# Patient Record
Sex: FEMALE | Race: OTHER | HISPANIC OR LATINO | URBAN - METROPOLITAN AREA
[De-identification: names, ages, dates, MRNs, and addresses within clinical notes are randomized per-mention and may not be internally consistent; named-entity substitution may affect disease eponyms.]

---

## 2019-08-18 ENCOUNTER — APPOINTMENT (INPATIENT)
Dept: RADIOLOGY | Facility: HOSPITAL | Age: 49
DRG: 141 | End: 2019-08-18
Payer: COMMERCIAL

## 2019-08-18 ENCOUNTER — HOSPITAL ENCOUNTER (INPATIENT)
Facility: HOSPITAL | Age: 49
LOS: 1 days | Discharge: HOME | DRG: 141 | End: 2019-08-19
Attending: EMERGENCY MEDICINE | Admitting: INTERNAL MEDICINE
Payer: COMMERCIAL

## 2019-08-18 DIAGNOSIS — J45.41 MODERATE PERSISTENT ASTHMA WITH ACUTE EXACERBATION: ICD-10-CM

## 2019-08-18 DIAGNOSIS — J45.902 STATUS ASTHMATICUS: Primary | ICD-10-CM

## 2019-08-18 PROBLEM — R76.12 (QFT) QUANTIFERON-TB TEST REACTION WITHOUT ACTIVE TUBERCULOSIS: Status: ACTIVE | Noted: 2019-08-18

## 2019-08-18 PROBLEM — J45.901 ACUTE ASTHMA EXACERBATION: Status: ACTIVE | Noted: 2019-08-18

## 2019-08-18 LAB
ALBUMIN SERPL BCP-MCNC: 3.7 G/DL (ref 3.5–5)
ALP SERPL-CCNC: 67 U/L (ref 46–116)
ALT SERPL W P-5'-P-CCNC: 16 U/L (ref 12–78)
ANION GAP SERPL CALCULATED.3IONS-SCNC: 7 MMOL/L (ref 4–13)
AST SERPL W P-5'-P-CCNC: 15 U/L (ref 5–45)
BASOPHILS # BLD AUTO: 0.02 THOUSANDS/ΜL (ref 0–0.1)
BASOPHILS NFR BLD AUTO: 0 % (ref 0–1)
BILIRUB SERPL-MCNC: 0.11 MG/DL (ref 0.2–1)
BUN SERPL-MCNC: 13 MG/DL (ref 5–25)
CALCIUM SERPL-MCNC: 8.4 MG/DL (ref 8.3–10.1)
CHLORIDE SERPL-SCNC: 107 MMOL/L (ref 100–108)
CO2 SERPL-SCNC: 27 MMOL/L (ref 21–32)
CREAT SERPL-MCNC: 0.89 MG/DL (ref 0.6–1.3)
EOSINOPHIL # BLD AUTO: 0.04 THOUSAND/ΜL (ref 0–0.61)
EOSINOPHIL NFR BLD AUTO: 1 % (ref 0–6)
ERYTHROCYTE [DISTWIDTH] IN BLOOD BY AUTOMATED COUNT: 11.9 % (ref 11.6–15.1)
GFR SERPL CREATININE-BSD FRML MDRD: 76 ML/MIN/1.73SQ M
GLUCOSE SERPL-MCNC: 143 MG/DL (ref 65–140)
HCT VFR BLD AUTO: 39.9 % (ref 34.8–46.1)
HGB BLD-MCNC: 13.5 G/DL (ref 11.5–15.4)
IMM GRANULOCYTES # BLD AUTO: 0.03 THOUSAND/UL (ref 0–0.2)
IMM GRANULOCYTES NFR BLD AUTO: 0 % (ref 0–2)
LYMPHOCYTES # BLD AUTO: 0.47 THOUSANDS/ΜL (ref 0.6–4.47)
LYMPHOCYTES NFR BLD AUTO: 7 % (ref 14–44)
MAGNESIUM SERPL-MCNC: 2.2 MG/DL (ref 1.6–2.6)
MCH RBC QN AUTO: 32.1 PG (ref 26.8–34.3)
MCHC RBC AUTO-ENTMCNC: 33.8 G/DL (ref 31.4–37.4)
MCV RBC AUTO: 95 FL (ref 82–98)
MONOCYTES # BLD AUTO: 0.13 THOUSAND/ΜL (ref 0.17–1.22)
MONOCYTES NFR BLD AUTO: 2 % (ref 4–12)
NEUTROPHILS # BLD AUTO: 6.35 THOUSANDS/ΜL (ref 1.85–7.62)
NEUTS SEG NFR BLD AUTO: 90 % (ref 43–75)
NRBC BLD AUTO-RTO: 0 /100 WBCS
PLATELET # BLD AUTO: 184 THOUSANDS/UL (ref 149–390)
PMV BLD AUTO: 10.5 FL (ref 8.9–12.7)
POTASSIUM SERPL-SCNC: 3.4 MMOL/L (ref 3.5–5.3)
PROT SERPL-MCNC: 7.1 G/DL (ref 6.4–8.2)
RBC # BLD AUTO: 4.21 MILLION/UL (ref 3.81–5.12)
SODIUM SERPL-SCNC: 141 MMOL/L (ref 136–145)
WBC # BLD AUTO: 7.04 THOUSAND/UL (ref 4.31–10.16)

## 2019-08-18 PROCEDURE — 99285 EMERGENCY DEPT VISIT HI MDM: CPT

## 2019-08-18 PROCEDURE — 99291 CRITICAL CARE FIRST HOUR: CPT | Performed by: EMERGENCY MEDICINE

## 2019-08-18 PROCEDURE — 71045 X-RAY EXAM CHEST 1 VIEW: CPT

## 2019-08-18 PROCEDURE — 85025 COMPLETE CBC W/AUTO DIFF WBC: CPT | Performed by: PHYSICIAN ASSISTANT

## 2019-08-18 PROCEDURE — 83735 ASSAY OF MAGNESIUM: CPT | Performed by: PHYSICIAN ASSISTANT

## 2019-08-18 PROCEDURE — 94640 AIRWAY INHALATION TREATMENT: CPT

## 2019-08-18 PROCEDURE — 80053 COMPREHEN METABOLIC PANEL: CPT | Performed by: PHYSICIAN ASSISTANT

## 2019-08-18 PROCEDURE — 99222 1ST HOSP IP/OBS MODERATE 55: CPT | Performed by: PHYSICIAN ASSISTANT

## 2019-08-18 RX ORDER — LEVALBUTEROL 1.25 MG/.5ML
1.25 SOLUTION, CONCENTRATE RESPIRATORY (INHALATION)
Status: DISCONTINUED | OUTPATIENT
Start: 2019-08-19 | End: 2019-08-19

## 2019-08-18 RX ORDER — METHYLPREDNISOLONE SODIUM SUCCINATE 40 MG/ML
40 INJECTION, POWDER, LYOPHILIZED, FOR SOLUTION INTRAMUSCULAR; INTRAVENOUS EVERY 8 HOURS SCHEDULED
Status: DISCONTINUED | OUTPATIENT
Start: 2019-08-19 | End: 2019-08-19

## 2019-08-18 RX ORDER — ALBUTEROL SULFATE 90 UG/1
2 AEROSOL, METERED RESPIRATORY (INHALATION) EVERY 4 HOURS PRN
COMMUNITY
Start: 2019-05-02 | End: 2020-05-01

## 2019-08-18 RX ORDER — LORATADINE 10 MG/1
10 TABLET ORAL DAILY
Status: DISCONTINUED | OUTPATIENT
Start: 2019-08-19 | End: 2019-08-19 | Stop reason: HOSPADM

## 2019-08-18 RX ORDER — ALBUTEROL SULFATE 90 UG/1
2 AEROSOL, METERED RESPIRATORY (INHALATION) EVERY 4 HOURS PRN
Status: DISCONTINUED | OUTPATIENT
Start: 2019-08-18 | End: 2019-08-19 | Stop reason: HOSPADM

## 2019-08-18 RX ORDER — SODIUM CHLORIDE FOR INHALATION 0.9 %
3 VIAL, NEBULIZER (ML) INHALATION
Status: DISCONTINUED | OUTPATIENT
Start: 2019-08-19 | End: 2019-08-19

## 2019-08-18 RX ORDER — SODIUM CHLORIDE FOR INHALATION 0.9 %
12 VIAL, NEBULIZER (ML) INHALATION ONCE
Status: COMPLETED | OUTPATIENT
Start: 2019-08-18 | End: 2019-08-18

## 2019-08-18 RX ORDER — BENZONATATE 100 MG/1
100 CAPSULE ORAL 3 TIMES DAILY PRN
Status: DISCONTINUED | OUTPATIENT
Start: 2019-08-18 | End: 2019-08-19

## 2019-08-18 RX ORDER — GUAIFENESIN 600 MG
600 TABLET, EXTENDED RELEASE 12 HR ORAL 2 TIMES DAILY
Status: DISCONTINUED | OUTPATIENT
Start: 2019-08-18 | End: 2019-08-19

## 2019-08-18 RX ORDER — IPRATROPIUM BROMIDE AND ALBUTEROL SULFATE .5; 3 MG/3ML; MG/3ML
1 SOLUTION RESPIRATORY (INHALATION) ONCE
Status: COMPLETED | OUTPATIENT
Start: 2019-08-18 | End: 2019-08-18

## 2019-08-18 RX ORDER — FLUTICASONE PROPIONATE 50 MCG
2 SPRAY, SUSPENSION (ML) NASAL DAILY
Status: DISCONTINUED | OUTPATIENT
Start: 2019-08-19 | End: 2019-08-19 | Stop reason: HOSPADM

## 2019-08-18 RX ORDER — FEXOFENADINE HCL 180 MG/1
180 TABLET ORAL DAILY
COMMUNITY
Start: 2019-06-04 | End: 2020-06-03

## 2019-08-18 RX ORDER — METHYLPREDNISOLONE SOD SUCC 125 MG
1 VIAL (EA) INJECTION ONCE
Status: COMPLETED | OUTPATIENT
Start: 2019-08-18 | End: 2019-08-18

## 2019-08-18 RX ORDER — ACETAMINOPHEN 325 MG/1
650 TABLET ORAL EVERY 6 HOURS PRN
Status: DISCONTINUED | OUTPATIENT
Start: 2019-08-18 | End: 2019-08-19

## 2019-08-18 RX ORDER — ONDANSETRON 2 MG/ML
4 INJECTION INTRAMUSCULAR; INTRAVENOUS EVERY 6 HOURS PRN
Status: DISCONTINUED | OUTPATIENT
Start: 2019-08-18 | End: 2019-08-19

## 2019-08-18 RX ADMIN — ISODIUM CHLORIDE 12 ML: 0.03 SOLUTION RESPIRATORY (INHALATION) at 19:19

## 2019-08-18 RX ADMIN — GUAIFENESIN 600 MG: 600 TABLET, EXTENDED RELEASE ORAL at 23:10

## 2019-08-18 RX ADMIN — ALBUTEROL SULFATE 10 MG: 2.5 SOLUTION RESPIRATORY (INHALATION) at 19:18

## 2019-08-18 RX ADMIN — IPRATROPIUM BROMIDE 1 MG: 0.5 SOLUTION RESPIRATORY (INHALATION) at 19:18

## 2019-08-18 RX ADMIN — RIFAMPIN 300 MG: 300 CAPSULE ORAL at 23:11

## 2019-08-18 NOTE — ED PROVIDER NOTES
History  Chief Complaint   Patient presents with    Asthma     arrives via EMS from home  hx of asthma  medications changed a few months ago and asthma getting worse  reports moving into new home with no ac and lots of dust  became short of breath  no relief with albuterol inhaler  recieved duoneb and solumedrol in route  46 yo female with longstanding h/o asthma, has never been admitted, but has been followed for years by pulmonologist, c/o sudden onset severe chest tightness and wheezing, to the point that she was almost unable to breath at all, had to be helped by a friend to puff on her inhaler while she laid on the floor, seems to have been triggered by dust while cleaning out a new house  They called an ambulance and she was given breathing treatment and steroids, and is now still wheezing, using accessory muscles but able to speak to registration when I came in  History provided by:  Patient  Shortness of Breath   Severity:  Severe  Onset quality:  Sudden  Duration:  1 hour  Timing:  Constant  Progression:  Improving  Chronicity:  New  Relieved by:  Nothing  Ineffective treatments:  Inhaler  Associated symptoms: cough    Associated symptoms: no abdominal pain, no chest pain, no fever, no headaches, no neck pain, no rash, no sore throat, no vomiting and no wheezing        Prior to Admission Medications   Prescriptions Last Dose Informant Patient Reported? Taking?    albuterol (PROVENTIL HFA,VENTOLIN HFA) 90 mcg/act inhaler   Yes Yes   Sig: Inhale 2 puffs every 4 (four) hours as needed   fexofenadine (ALLEGRA) 180 MG tablet   Yes Yes   Sig: Take 180 mg by mouth daily   fluticasone (VERAMYST) 27 5 MCG/SPRAY nasal spray   Yes Yes   Sig: spray 1 spray (27 5MCG)  by intranasal route  every day in each nostril   rifampin (RIFADIN) 300 mg capsule   Yes No   Sig: Take 300 mg by mouth daily      Facility-Administered Medications: None       Past Medical History:   Diagnosis Date    Asthma        Past Surgical History:   Procedure Laterality Date    HYSTERECTOMY         History reviewed  No pertinent family history  I have reviewed and agree with the history as documented  Social History     Tobacco Use    Smoking status: Never Smoker    Smokeless tobacco: Never Used   Substance Use Topics    Alcohol use: Not Currently     Frequency: Never     Binge frequency: Never    Drug use: Not Currently        Review of Systems   Constitutional: Negative for appetite change, chills and fever  HENT: Negative for sore throat  Respiratory: Positive for cough and shortness of breath  Negative for wheezing  Cardiovascular: Negative for chest pain and palpitations  Gastrointestinal: Negative for abdominal pain, diarrhea, nausea and vomiting  Genitourinary: Negative for dysuria and hematuria  Musculoskeletal: Negative for neck pain  Skin: Negative for rash  Neurological: Negative for dizziness, weakness and headaches  Psychiatric/Behavioral: Negative for suicidal ideas  All other systems reviewed and are negative  Physical Exam  Physical Exam   Constitutional: She is oriented to person, place, and time  She appears well-developed and well-nourished  She appears distressed  HENT:   Head: Normocephalic and atraumatic  Right Ear: External ear normal    Left Ear: External ear normal    Nose: Nose normal    Eyes: Pupils are equal, round, and reactive to light  Conjunctivae and EOM are normal    Neck: Normal range of motion  Neck supple  Cardiovascular: Normal rate and regular rhythm  Pulmonary/Chest: Accessory muscle usage present  Tachypnea noted  She is in respiratory distress  She has decreased breath sounds  She has wheezes  Abdominal: Soft  Musculoskeletal: Normal range of motion  Neurological: She is alert and oriented to person, place, and time  She has normal strength  Gait normal    Skin: Skin is warm and dry  Capillary refill takes less than 2 seconds  No rash noted   She is not diaphoretic  Psychiatric: She has a normal mood and affect  Her behavior is normal  Judgment and thought content normal    Nursing note and vitals reviewed        Vital Signs  ED Triage Vitals [08/18/19 1849]   Temperature Pulse Respirations Blood Pressure SpO2   98 2 °F (36 8 °C) 96 20 144/84 98 %      Temp Source Heart Rate Source Patient Position - Orthostatic VS BP Location FiO2 (%)   Oral Monitor Lying Right arm --      Pain Score       No Pain           Vitals:    08/18/19 1849 08/18/19 2033 08/18/19 2318   BP: 144/84 128/86 130/90   Pulse: 96 102 91   Patient Position - Orthostatic VS: Lying Lying Lying         Visual Acuity      ED Medications  Medications   loratadine (CLARITIN) tablet 10 mg (has no administration in time range)   fluticasone (FLONASE) 50 mcg/act nasal spray 2 spray (has no administration in time range)   rifampin (RIFADIN) capsule 300 mg (300 mg Oral Given 8/18/19 2311)   ondansetron (ZOFRAN) injection 4 mg (has no administration in time range)   guaiFENesin (MUCINEX) 12 hr tablet 600 mg (600 mg Oral Given 8/18/19 2310)   benzonatate (TESSALON PERLES) capsule 100 mg (has no administration in time range)   levalbuterol (XOPENEX) inhalation solution 1 25 mg (has no administration in time range)     And   sodium chloride 0 9 % inhalation solution 3 mL (has no administration in time range)   ipratropium (ATROVENT) 0 02 % inhalation solution 0 5 mg (has no administration in time range)   methylPREDNISolone sodium succinate (Solu-MEDROL) injection 40 mg (has no administration in time range)   acetaminophen (TYLENOL) tablet 650 mg (has no administration in time range)   albuterol (PROVENTIL HFA,VENTOLIN HFA) inhaler 2 puff (has no administration in time range)   ipratropium-albuterol (FOR EMS ONLY) (DUO-NEB) 0 5-2 5 mg/3 mL inhalation solution 3 mL (0 mL Does not apply Given to EMS 8/18/19 1852)   methylPREDNISolone sodium succinate (FOR EMS ONLY) (Solu-MEDROL) 125 MG injection 125 mg (0 mg Does not apply Given to EMS 8/18/19 1852)   albuterol inhalation solution 10 mg (10 mg Nebulization Given 8/18/19 1918)   ipratropium (ATROVENT) 0 02 % inhalation solution 1 mg (1 mg Nebulization Given 8/18/19 1918)   sodium chloride 0 9 % inhalation solution 12 mL (12 mL Nebulization Given 8/18/19 1919)       Diagnostic Studies  Results Reviewed     None                 XR chest portable    (Results Pending)              Procedures  CriticalCare Time  Performed by: David Wilcox MD  Authorized by: David Wilcox MD     Critical care provider statement:     Critical care time (minutes):  30    Critical care time was exclusive of:  Separately billable procedures and treating other patients and teaching time    Critical care was necessary to treat or prevent imminent or life-threatening deterioration of the following conditions:  Respiratory failure    Critical care was time spent personally by me on the following activities:  Blood draw for specimens, obtaining history from patient or surrogate, development of treatment plan with patient or surrogate, discussions with consultants, evaluation of patient's response to treatment, examination of patient, interpretation of cardiac output measurements, ordering and performing treatments and interventions, re-evaluation of patient's condition and review of old charts    I assumed direction of critical care for this patient from another provider in my specialty: no             ED Course  ED Course as of Aug 18 2347   Sun Aug 18, 2019   2051 After NASH, still has significant bilateral wheezing, dyspnea, albeit improving, I am recommending admission for serial nebs, steroids                                      MDM    Disposition  Final diagnoses:   Status asthmaticus     Time reflects when diagnosis was documented in both MDM as applicable and the Disposition within this note     Time User Action Codes Description Comment    8/18/2019  8:56 PM Kiarra Wallace Add [J45 902] Status asthmaticus       ED Disposition     ED Disposition Condition Date/Time Comment    Admit Stable Sun Aug 18, 2019  8:56 PM Case was discussed with Delmy Carbajal and the patient's admission status was agreed to be Admission Status: inpatient status to the service of Dr Jimenez Bull   Follow-up Information    None         Current Discharge Medication List      CONTINUE these medications which have NOT CHANGED    Details   albuterol (PROVENTIL HFA,VENTOLIN HFA) 90 mcg/act inhaler Inhale 2 puffs every 4 (four) hours as needed    Comments: Substitution to a formulary equivalent within the same pharmaceutical class is authorized  fexofenadine (ALLEGRA) 180 MG tablet Take 180 mg by mouth daily      fluticasone (VERAMYST) 27 5 MCG/SPRAY nasal spray spray 1 spray (27 5MCG)  by intranasal route  every day in each nostril      rifampin (RIFADIN) 300 mg capsule Take 300 mg by mouth daily           No discharge procedures on file      ED Provider  Electronically Signed by           Preet Edwards MD  08/18/19 3574

## 2019-08-19 VITALS
TEMPERATURE: 98.9 F | WEIGHT: 150.35 LBS | HEART RATE: 76 BPM | SYSTOLIC BLOOD PRESSURE: 118 MMHG | DIASTOLIC BLOOD PRESSURE: 81 MMHG | OXYGEN SATURATION: 95 % | RESPIRATION RATE: 18 BRPM

## 2019-08-19 LAB
ANION GAP SERPL CALCULATED.3IONS-SCNC: 9 MMOL/L (ref 4–13)
BASOPHILS # BLD AUTO: 0.02 THOUSANDS/ΜL (ref 0–0.1)
BASOPHILS NFR BLD AUTO: 0 % (ref 0–1)
BUN SERPL-MCNC: 12 MG/DL (ref 5–25)
CALCIUM SERPL-MCNC: 8.2 MG/DL (ref 8.3–10.1)
CHLORIDE SERPL-SCNC: 108 MMOL/L (ref 100–108)
CO2 SERPL-SCNC: 24 MMOL/L (ref 21–32)
CREAT SERPL-MCNC: 0.64 MG/DL (ref 0.6–1.3)
EOSINOPHIL # BLD AUTO: 0.06 THOUSAND/ΜL (ref 0–0.61)
EOSINOPHIL NFR BLD AUTO: 1 % (ref 0–6)
ERYTHROCYTE [DISTWIDTH] IN BLOOD BY AUTOMATED COUNT: 11.9 % (ref 11.6–15.1)
GFR SERPL CREATININE-BSD FRML MDRD: 104 ML/MIN/1.73SQ M
GLUCOSE SERPL-MCNC: 91 MG/DL (ref 65–140)
HCT VFR BLD AUTO: 38.5 % (ref 34.8–46.1)
HGB BLD-MCNC: 12.7 G/DL (ref 11.5–15.4)
IMM GRANULOCYTES # BLD AUTO: 0.02 THOUSAND/UL (ref 0–0.2)
IMM GRANULOCYTES NFR BLD AUTO: 0 % (ref 0–2)
LYMPHOCYTES # BLD AUTO: 1.6 THOUSANDS/ΜL (ref 0.6–4.47)
LYMPHOCYTES NFR BLD AUTO: 23 % (ref 14–44)
MCH RBC QN AUTO: 31.1 PG (ref 26.8–34.3)
MCHC RBC AUTO-ENTMCNC: 33 G/DL (ref 31.4–37.4)
MCV RBC AUTO: 94 FL (ref 82–98)
MONOCYTES # BLD AUTO: 0.39 THOUSAND/ΜL (ref 0.17–1.22)
MONOCYTES NFR BLD AUTO: 6 % (ref 4–12)
NEUTROPHILS # BLD AUTO: 4.78 THOUSANDS/ΜL (ref 1.85–7.62)
NEUTS SEG NFR BLD AUTO: 70 % (ref 43–75)
NRBC BLD AUTO-RTO: 0 /100 WBCS
PLATELET # BLD AUTO: 179 THOUSANDS/UL (ref 149–390)
PMV BLD AUTO: 10.2 FL (ref 8.9–12.7)
POTASSIUM SERPL-SCNC: 3.7 MMOL/L (ref 3.5–5.3)
RBC # BLD AUTO: 4.08 MILLION/UL (ref 3.81–5.12)
SODIUM SERPL-SCNC: 141 MMOL/L (ref 136–145)
WBC # BLD AUTO: 6.87 THOUSAND/UL (ref 4.31–10.16)

## 2019-08-19 PROCEDURE — 85025 COMPLETE CBC W/AUTO DIFF WBC: CPT | Performed by: PHYSICIAN ASSISTANT

## 2019-08-19 PROCEDURE — 94640 AIRWAY INHALATION TREATMENT: CPT

## 2019-08-19 PROCEDURE — 94760 N-INVAS EAR/PLS OXIMETRY 1: CPT

## 2019-08-19 PROCEDURE — 99239 HOSP IP/OBS DSCHRG MGMT >30: CPT | Performed by: INTERNAL MEDICINE

## 2019-08-19 PROCEDURE — 80048 BASIC METABOLIC PNL TOTAL CA: CPT | Performed by: PHYSICIAN ASSISTANT

## 2019-08-19 RX ORDER — METHYLPREDNISOLONE SODIUM SUCCINATE 40 MG/ML
20 INJECTION, POWDER, LYOPHILIZED, FOR SOLUTION INTRAMUSCULAR; INTRAVENOUS EVERY 8 HOURS SCHEDULED
Status: DISCONTINUED | OUTPATIENT
Start: 2019-08-19 | End: 2019-08-19

## 2019-08-19 RX ORDER — PREDNISONE 20 MG/1
20 TABLET ORAL DAILY
Status: DISCONTINUED | OUTPATIENT
Start: 2019-08-20 | End: 2019-08-19 | Stop reason: HOSPADM

## 2019-08-19 RX ORDER — PREDNISONE 20 MG/1
20 TABLET ORAL DAILY
Qty: 5 TABLET | Refills: 0 | Status: SHIPPED | OUTPATIENT
Start: 2019-08-20

## 2019-08-19 RX ADMIN — FLUTICASONE PROPIONATE 2 SPRAY: 50 SPRAY, METERED NASAL at 09:36

## 2019-08-19 RX ADMIN — IPRATROPIUM BROMIDE 0.5 MG: 0.5 SOLUTION RESPIRATORY (INHALATION) at 08:02

## 2019-08-19 RX ADMIN — LORATADINE 10 MG: 10 TABLET ORAL at 09:36

## 2019-08-19 RX ADMIN — LEVALBUTEROL 1.25 MG: 1.25 SOLUTION, CONCENTRATE RESPIRATORY (INHALATION) at 08:02

## 2019-08-19 RX ADMIN — METHYLPREDNISOLONE SODIUM SUCCINATE 40 MG: 40 INJECTION, POWDER, FOR SOLUTION INTRAMUSCULAR; INTRAVENOUS at 05:04

## 2019-08-19 RX ADMIN — RIFAMPIN 300 MG: 300 CAPSULE ORAL at 09:36

## 2019-08-19 NOTE — NURSING NOTE
Pt given discharge instructions in company of SO  Peripheral IVs removed  Script given to patient  Pt able to ambulate self out of hospital  Home medications obtained from pharmacy and sent home with pt  Work excuse note obtained from Dr Rufina Foley and given to pt  No questions per pt and SO at this time

## 2019-08-19 NOTE — PROGRESS NOTES
Progress Note - Albino Chang 47 y o  female MRN: [id number]    Unit/Bed#: [place] [address] -01 Encounter: [id number]    Assessment/Plan:    Acute asthma exacerbation  continue IV steroids will taper to 20 milligrams q 8 hours, p r n  Albuterol, closely monitor pulse ox, await chest x-ray results    Latent TB    continue rifampin    Seasonal allergies   continue Claritin    Subjective:   Feels better, still cough yellow sputum no chest pain no nausea vomiting diarrhea no fevers chills appetite is okay    Objective:     Vitals: Blood pressure 118/81, pulse 76, temperature 98 9 °F (37 2 °C), temperature source Temporal, resp  rate 18, weight 68 2 kg (150 lb 5 7 oz), SpO2 95 %  ,There is no height or weight on file to calculate BMI        Results from last 7 days   Lab Units 08/19/19  0458   WBC Thousand/uL 6 87   HEMOGLOBIN g/dL 12 7   HEMATOCRIT % 38 5   PLATELETS Thousands/uL 179     Results from last 7 days   Lab Units 08/19/19  0458 08/18/19  2240   POTASSIUM mmol/L 3 7 3 4*   CHLORIDE mmol/L 108 107   CO2 mmol/L 24 27   BUN mg/dL 12 13   CREATININE mg/dL 0 64 0 89   CALCIUM mg/dL 8 2* 8 4   ALK PHOS U/L  --  67   ALT U/L  --  16   AST U/L  --  15       Scheduled Meds:    Current Facility-Administered Medications:  acetaminophen 650 mg Oral Q6H PRN Nimo Kiara, PA-C   albuterol 2 puff Inhalation Q4H PRN Nimo Kiara, PA-C   benzonatate 100 mg Oral TID PRN Nimo Craig, PA-C   fluticasone 2 spray Each Nare Daily Nimo Craig, PA-C   loratadine 10 mg Oral Daily MAE Aguirre-MARYSE   methylPREDNISolone sodium succinate 20 mg Intravenous [facility], DO   ondansetron 4 mg Intravenous Q6H PRN Ayesha Ojeda PA-C   rifampin 300 mg Oral Q12H [address] Nimotoy Craig, PA-C       Continuous Infusions:     Physical exam:  General appearance:  Alert no distress interaction appropriate   Head/Eyes:  Nonicteric PERRL EOMI  Neck:  Supple  Lungs:  Decreased BS bilateral occasional inspiratory squeak/wheeze  Heart: normal S1 S2 regular  Abdomen: Soft nontender with bowel sounds  Extremities: no edema  Skin: no rash    Invasive Devices     Peripheral Intravenous Line            Peripheral IV 08/18/19 Left Hand less than 1 day    Peripheral IV 08/19/19 Left Antecubital less than 1 day                  Counseling / Coordination of Care  Total floor / unit time spent today 30  minutes  Greater than 50% of total time was spent with the patient and / or family counseling and / or coordination of care    A description of the counseling / coordination of care:

## 2019-08-19 NOTE — PLAN OF CARE
Problem: RESPIRATORY - ADULT  Goal: Achieves optimal ventilation and oxygenation  Description  INTERVENTIONS:  - Assess for changes in respiratory status  - Assess for changes in mentation and behavior  - Position to facilitate oxygenation and minimize respiratory effort  - Oxygen administered by appropriate delivery if ordered  - Initiate smoking cessation education as indicated  - Encourage broncho-pulmonary hygiene including cough, deep breathe, Incentive Spirometry  - Assess the need for suctioning and aspirate as needed  - Assess and instruct to report SOB or any respiratory difficulty  - Respiratory Therapy support as indicated  Outcome: Progressing     Problem: PAIN - ADULT  Goal: Verbalizes/displays adequate comfort level or baseline comfort level  Description  Interventions:  - Encourage patient to monitor pain and request assistance  - Assess pain using appropriate pain scale  - Administer analgesics based on type and severity of pain and evaluate response  - Implement non-pharmacological measures as appropriate and evaluate response  - Consider cultural and social influences on pain and pain management  - Notify physician/advanced practitioner if interventions unsuccessful or patient reports new pain  Outcome: Progressing     Problem: INFECTION - ADULT  Goal: Absence or prevention of progression during hospitalization  Description  INTERVENTIONS:  - Assess and monitor for signs and symptoms of infection  - Monitor lab/diagnostic results  - Monitor all insertion sites, i e  indwelling lines, tubes, and drains  - Monitor endotracheal if appropriate and nasal secretions for changes in amount and color  - [place] appropriate cooling/warming therapies per order  - Administer medications as ordered  - Instruct and encourage patient and family to use good hand hygiene technique  - Identify and instruct in appropriate isolation precautions for identified infection/condition  Outcome: Progressing  Goal: Absence of fever/infection during neutropenic period  Description  INTERVENTIONS:  - Monitor WBC    Outcome: Progressing     Problem: SAFETY ADULT  Goal: Patient will remain free of falls  Description  INTERVENTIONS:  - Assess patient frequently for physical needs  -  Identify cognitive and physical deficits and behaviors that affect risk of falls    -  [place] fall precautions as indicated by assessment   - Educate patient/family on patient safety including physical limitations  - Instruct patient to call for assistance with activity based on assessment  - Modify environment to reduce risk of injury  - Consider OT/PT consult to assist with strengthening/mobility  Outcome: Progressing  Goal: Maintain or return to baseline ADL function  Description  INTERVENTIONS:  -  Assess patient's ability to carry out ADLs; assess patient's baseline for ADL function and identify physical deficits which impact ability to perform ADLs (bathing, care of mouth/teeth, toileting, grooming, dressing, etc )  - Assess/evaluate cause of self-care deficits   - Assess range of motion  - Assess patient's mobility; develop plan if impaired  - Assess patient's need for assistive devices and provide as appropriate  - Encourage maximum independence but intervene and supervise when necessary  - Involve family in performance of ADLs  - Assess for home care needs following discharge   - Consider OT consult to assist with ADL evaluation and planning for discharge  - Provide patient education as appropriate  Outcome: Progressing  Goal: Maintain or return mobility status to optimal level  Description  INTERVENTIONS:  - Assess patient's baseline mobility status (ambulation, transfers, stairs, etc )    - Identify cognitive and physical deficits and behaviors that affect mobility  - Identify mobility aids required to assist with transfers and/or ambulation (gait belt, sit-to-stand, lift, walker, cane, etc )  - [place] fall precautions as indicated by assessment  - Record patient progress and toleration of activity level on Mobility SBAR; progress patient to next Phase/Stage  - Instruct patient to call for assistance with activity based on assessment  - Consider rehabilitation consult to assist with strengthening/weightbearing, etc   Outcome: Progressing     Problem: DISCHARGE PLANNING  Goal: Discharge to home or other facility with appropriate resources  Description  INTERVENTIONS:  - Identify barriers to discharge w/patient and caregiver  - Arrange for needed discharge resources and transportation as appropriate  - Identify discharge learning needs (meds, wound care, etc )  - Arrange for interpretive services to assist at discharge as needed  - Refer to Case Management Department for coordinating discharge planning if the patient needs post-hospital services based on physician/advanced practitioner order or complex needs related to functional status, cognitive ability, or social support system  Outcome: Progressing     Problem: Knowledge Deficit  Goal: Patient/family/caregiver demonstrates understanding of disease process, treatment plan, medications, and discharge instructions  Description  Complete learning assessment and assess knowledge base    Interventions:  - Provide teaching at level of understanding  - Provide teaching via preferred learning methods  Outcome: Progressing

## 2019-08-19 NOTE — ASSESSMENT & PLAN NOTE
· Follows with infectious disease as an outpatient  · + quantiFERON gold test however negative CXR - likely due to history of vaccination as a child  · Currently being treated for latent TB on rifampin 300 mg BID x 4 months - one month remaining  · Negative air flow room, standard precautions  · Obtain CXR  · Follow up with ID as an outpatient

## 2019-08-19 NOTE — PLAN OF CARE
Problem: RESPIRATORY - ADULT  Goal: Achieves optimal ventilation and oxygenation  Description  INTERVENTIONS:  - Assess for changes in respiratory status  - Assess for changes in mentation and behavior  - Position to facilitate oxygenation and minimize respiratory effort  - Oxygen administered by appropriate delivery if ordered  - Initiate smoking cessation education as indicated  - Encourage broncho-pulmonary hygiene including cough, deep breathe, Incentive Spirometry  - Assess the need for suctioning and aspirate as needed  - Assess and instruct to report SOB or any respiratory difficulty  - Respiratory Therapy support as indicated  Outcome: Progressing     Problem: PAIN - ADULT  Goal: Verbalizes/displays adequate comfort level or baseline comfort level  Description  Interventions:  - Encourage patient to monitor pain and request assistance  - Assess pain using appropriate pain scale  - Administer analgesics based on type and severity of pain and evaluate response  - Implement non-pharmacological measures as appropriate and evaluate response  - Consider cultural and social influences on pain and pain management  - Notify physician/advanced practitioner if interventions unsuccessful or patient reports new pain  Outcome: Progressing     Problem: INFECTION - ADULT  Goal: Absence or prevention of progression during hospitalization  Description  INTERVENTIONS:  - Assess and monitor for signs and symptoms of infection  - Monitor lab/diagnostic results  - Monitor all insertion sites, i e  indwelling lines, tubes, and drains  - Monitor endotracheal if appropriate and nasal secretions for changes in amount and color  - [place] appropriate cooling/warming therapies per order  - Administer medications as ordered  - Instruct and encourage patient and family to use good hand hygiene technique  - Identify and instruct in appropriate isolation precautions for identified infection/condition  Outcome: Progressing  Goal: Absence of fever/infection during neutropenic period  Description  INTERVENTIONS:  - Monitor WBC    Outcome: Progressing     Problem: SAFETY ADULT  Goal: Patient will remain free of falls  Description  INTERVENTIONS:  - Assess patient frequently for physical needs  -  Identify cognitive and physical deficits and behaviors that affect risk of falls    -  [place] fall precautions as indicated by assessment   - Educate patient/family on patient safety including physical limitations  - Instruct patient to call for assistance with activity based on assessment  - Modify environment to reduce risk of injury  - Consider OT/PT consult to assist with strengthening/mobility  Outcome: Progressing  Goal: Maintain or return to baseline ADL function  Description  INTERVENTIONS:  -  Assess patient's ability to carry out ADLs; assess patient's baseline for ADL function and identify physical deficits which impact ability to perform ADLs (bathing, care of mouth/teeth, toileting, grooming, dressing, etc )  - Assess/evaluate cause of self-care deficits   - Assess range of motion  - Assess patient's mobility; develop plan if impaired  - Assess patient's need for assistive devices and provide as appropriate  - Encourage maximum independence but intervene and supervise when necessary  - Involve family in performance of ADLs  - Assess for home care needs following discharge   - Consider OT consult to assist with ADL evaluation and planning for discharge  - Provide patient education as appropriate  Outcome: Progressing  Goal: Maintain or return mobility status to optimal level  Description  INTERVENTIONS:  - Assess patient's baseline mobility status (ambulation, transfers, stairs, etc )    - Identify cognitive and physical deficits and behaviors that affect mobility  - Identify mobility aids required to assist with transfers and/or ambulation (gait belt, sit-to-stand, lift, walker, cane, etc )  - [place] fall precautions as indicated by assessment  - Record patient progress and toleration of activity level on Mobility SBAR; progress patient to next Phase/Stage  - Instruct patient to call for assistance with activity based on assessment  - Consider rehabilitation consult to assist with strengthening/weightbearing, etc   Outcome: Progressing     Problem: DISCHARGE PLANNING  Goal: Discharge to home or other facility with appropriate resources  Description  INTERVENTIONS:  - Identify barriers to discharge w/patient and caregiver  - Arrange for needed discharge resources and transportation as appropriate  - Identify discharge learning needs (meds, wound care, etc )  - Arrange for interpretive services to assist at discharge as needed  - Refer to Case Management Department for coordinating discharge planning if the patient needs post-hospital services based on physician/advanced practitioner order or complex needs related to functional status, cognitive ability, or social support system  Outcome: Progressing     Problem: Knowledge Deficit  Goal: Patient/family/caregiver demonstrates understanding of disease process, treatment plan, medications, and discharge instructions  Description  Complete learning assessment and assess knowledge base    Interventions:  - Provide teaching at level of understanding  - Provide teaching via preferred learning methods  Outcome: Progressing

## 2019-08-19 NOTE — UTILIZATION REVIEW
Initial Clinical Review    Admission: Date/Time/Statement: 8/18/19 @ 2056     Orders Placed This Encounter   Procedures    Inpatient Admission     Standing Status:   Standing     Number of Occurrences:   1     Order Specific Question:   Admitting Physician     Answer:   Molly Junior [[id number]]     Order Specific Question:   Level of Care     Answer:   Med Surg [[id number]]     Order Specific Question:   Estimated length of stay     Answer:   More than 2 Midnights     Order Specific Question:   Certification     Answer:   I certify that inpatient services are medically necessary for this patient for a duration of greater than two midnights  See H&P and MD Progress Notes for additional information about the patient's course of treatment  ED Arrival Information     Expected Arrival Acuity Means of Arrival Escorted By Service Admission Type    - 8/18/2019 18:45 Urgent [facility] ([address]) General Medicine Urgent    Arrival Complaint    asthma        Chief Complaint   Patient presents with    Asthma     arrives via EMS from home  hx of asthma  medications changed a few months ago and asthma getting worse  reports moving into new home with no ac and lots of dust  became short of breath  no relief with albuterol inhaler  recieved duoneb and solumedrol in route  Assessment/Plan: 46 yo female w/ hx of asthma  presents to ED from home via EMS with  sudden onset severe chest tightness and wheezing, to the point that she was almost unable to breath at all  Had to be helped by a friend to puff on her inhaler while she laid on the floor, seems to have been triggered by dust while cleaning out a new house  EMS was called - given Nebs TX  And steroids  Still wheezing on arrival to ED & with accessory muscle use  After NASH RX in ED  still with significant bilateral wheezing, dyspnea  Admitted to IP with Acute Asthma Exac  IV steroids, Nebs Tx's  Mucinex, Tessalon perles prn   Latent TB:   + quantiFERON gold test however negative CXR - likely due to history of vaccination as a child  Currently being treated for latent TB on rifampin 300 mg BID x 4 months - one month remaining  Negative air flow room, CXR      8/19:  + cough with yellow sputum  Decreased BS bilateral occasional inspiratory squeak/wheeze  Continue steroid taper, prn albuterol  Closely monitor pulse ox  Decrease SoluMedrol to 20 IV q8h    ED Triage Vitals [08/18/19 1849]   Temperature Pulse Respirations Blood Pressure SpO2   98 2 °F (36 8 °C) 96 20 144/84 98 %      Temp Source Heart Rate Source Patient Position - Orthostatic VS BP Location FiO2 (%)   Oral Monitor Lying Right arm --      Pain Score       No Pain        Wt Readings from Last 1 Encounters:   08/18/19 68 2 kg (150 lb 5 7 oz)     Additional Vital Signs:   08/19/19 0739  98 9 °F (37 2 °C)  76  18  118/81  95 %  None (Room air)   08/18/19 2318  97 7 °F (36 5 °C)  91  18  130/90  96 %  None (Room air)   08/18/19 2033    102  18  128/86  100 %  None (Room air)       Pertinent Labs/Diagnostic Test Results:   Results from last 7 days   Lab Units 08/19/19  0458 08/18/19  2240   WBC Thousand/uL 6 87 7 04   HEMOGLOBIN g/dL 12 7 13 5   HEMATOCRIT % 38 5 39 9   PLATELETS Thousands/uL 179 184   NEUTROS ABS Thousands/µL 4 78 6 35     Results from last 7 days   Lab Units 08/19/19  0458 08/18/19  2240   SODIUM mmol/L 141 141   POTASSIUM mmol/L 3 7 3 4*   CHLORIDE mmol/L 108 107   CO2 mmol/L 24 27   ANION GAP mmol/L 9 7   BUN mg/dL 12 13   CREATININE mg/dL 0 64 0 89   EGFR ml/min/1 73sq m 104 76   CALCIUM mg/dL 8 2* 8 4   MAGNESIUM mg/dL  --  2 2     Results from last 7 days   Lab Units 08/18/19  2240   AST U/L 15   ALT U/L 16   ALK PHOS U/L 67   TOTAL PROTEIN g/dL 7 1   ALBUMIN g/dL 3 7   TOTAL BILIRUBIN mg/dL 0 11*     Results from last 7 days   Lab Units 08/19/19  0458 08/18/19  2240   GLUCOSE RANDOM mg/dL 91 143*     8/18 CXR: No acute cardiopulmonary disease      ED Treatment:   Medication Administration from 08/18/2019 1845 to 08/18/2019 2146       Date/Time Order Dose Route Action     08/18/2019 1852 ipratropium-albuterol (FOR EMS ONLY) (DUO-NEB) 0 5-2 5 mg/3 mL inhalation solution 3 mL 0 mL Does not apply Given to EMS     08/18/2019 1852 methylPREDNISolone sodium succinate (FOR EMS ONLY) (Solu-MEDROL) 125 MG injection 125 mg 0 mg Does not apply Given to EMS     08/18/2019 1918 albuterol inhalation solution 10 mg 10 mg Nebulization Given     08/18/2019 1918 ipratropium (ATROVENT) 0 02 % inhalation solution 1 mg 1 mg Nebulization Given     08/18/2019 1919 sodium chloride 0 9 % inhalation solution 12 mL 12 mL Nebulization Given        Past Medical History:   Diagnosis Date    Asthma      Present on Admission:   Acute asthma exacerbation   (QFT) QuantiFERON-TB test reaction without active tuberculosis    Admitting Diagnosis: Status asthmaticus [J45 902]  Asthma [J45 909]  Age/Sex: 47 y o  female     Admission Orders:  Current Facility-Administered Medications:  acetaminophen 650 mg Oral Q6H PRN    albuterol 2 puff Inhalation Q4H PRN    benzonatate 100 mg Oral TID PRN    fluticasone 2 spray Each Nare Daily    loratadine 10 mg Oral Daily    methylPREDNISolone sodium succinate 40 mg Intravenous Q8H [address]    ondansetron 4 mg Intravenous Q6H PRN    rifampin 300 mg Oral Q12H [address]      Nebs TX with Atrovent and Xopenox tid      Network Utilization Review Department  Phone: [phone]; Fax [phone]  [email or web address]  ATTENTION: Please call with any questions or concerns to [phone]  and carefully listen to the prompts so that you are directed to the right person  Send all requests for admission clinical reviews, approved or denied determinations and any other requests to fax [phone]   All voicemails are confidential

## 2019-08-19 NOTE — H&P
H&P- Gela Escudero 1970, 47 y o  female MRN: [id number]    Unit/Bed#: ED 18 Encounter: [id number]    Primary Care Provider: Eddie Nowak   Date and time admitted to hospital: 8/18/2019  6:50 PM    * Acute asthma exacerbation  Assessment & Plan  · Presents to the emergency department with worsening shortness of breath x 2 days - acutely worsened today after exposure to dust/pet dander  · Vital signs stable at time of admission  SpO2 98% on RA  Work of breathing improved with neb treatment  · Obtain CXR  · Obtain CBC, CMP, magnesium  · Continue scheduled nebulizer treatments  · Received loading dose solu-medrol - continue 40 mg q 8 hours  · Mucinex BID  · Tessalon perles PRN    (QFT) QuantiFERON-TB test reaction without active tuberculosis  Assessment & Plan  · Follows with infectious disease as an outpatient  · + quantiFERON gold test however negative CXR - likely due to history of vaccination as a child  · Currently being treated for latent TB on rifampin 300 mg BID x 4 months - one month remaining  · Negative air flow room, standard precautions  · Obtain CXR  · Follow up with ID as an outpatient    VTE Prophylaxis: Early ambulation  / reason for no mechanical VTE prophylaxis ambulate   Code Status: Level 1 - Full Code  POLST: There is no POLST form on file for this patient (pre-hospital)  Discussion with family: Discussed with patient directly at bedside    Anticipated Length of Stay:  Patient will be admitted on an Inpatient basis with an anticipated length of stay of  Greater than 2 midnights  Justification for Hospital Stay: asthma exacerbation    Total Time for Visit, including Counseling / Coordination of Care: 45 minutes  Greater than 50% of this total time spent on direct patient counseling and coordination of care  Chief Complaint:   "Worsening shortness of breath for 2 days"    History of Present Illness:    Gela Escudero is a 47 y o  female who presents with shortness of breath      Past medical history significant for prior + quantiFERON gold but no active TB, asthma  Patient states that they bought a new house recently and they have been moving a lot of furniture and she has been cleaning a lot - with exposure to dust   She also has been around a dog belonging to another family member, she states she usually doesn't have a problem with her own dog because she is considered hypoallergenic  She also attributes to worsening humidity as contributing to her asthma worsening in the past   Has never needed to be evaluated in the hospital in the past for her asthma/no prior intubations  Denies any fever/chills, chest pain, nausea/vomiting, abdominal pain  She reports a dry cough and shortness of breath with brief ambulation  Currently feels improved after breathing treatments and steroids  Review of Systems:    Review of Systems   Constitutional: Negative for chills, fatigue, fever and unexpected weight change  HENT: Negative for congestion, sore throat and trouble swallowing  Eyes: Negative for photophobia, pain and visual disturbance  Respiratory: Positive for cough, shortness of breath and wheezing  Cardiovascular: Negative for chest pain, palpitations and leg swelling  Gastrointestinal: Negative for abdominal pain, constipation, diarrhea, nausea and vomiting  Endocrine: Negative for polyuria  Genitourinary: Negative for difficulty urinating, dysuria, flank pain, hematuria and urgency  Musculoskeletal: Negative for back pain, myalgias, neck pain and neck stiffness  Skin: Negative for pallor and rash  Neurological: Negative for dizziness, tremors, syncope, weakness, light-headedness, numbness and headaches  Hematological: Does not bruise/bleed easily  Psychiatric/Behavioral: Negative for agitation and confusion         Past Medical and Surgical History:     Past Medical History:   Diagnosis Date    Asthma        Past Surgical History:   Procedure Laterality Date    HYSTERECTOMY         Meds/Allergies:    Prior to Admission medications    Medication Sig Start Date End Date Taking? Authorizing Provider   albuterol (PROVENTIL HFA,VENTOLIN HFA) 90 mcg/act inhaler Inhale 2 puffs every 4 (four) hours as needed 5/2/19 5/1/20 Yes Historical Provider, MD   fexofenadine (ALLEGRA) 180 MG tablet Take 180 mg by mouth daily 6/4/19 6/3/20 Yes Historical Provider, MD   fluticasone (VERAMYST) 27 5 MCG/SPRAY nasal spray spray 1 spray (27 5MCG)  by intranasal route  every day in each nostril 3/27/13  Yes Historical Provider, MD   rifampin (RIFADIN) 300 mg capsule Take 300 mg by mouth daily    Historical Provider, MD     I have reviewed home medications with patient personally  Allergies: Allergies   Allergen Reactions    Banana      Throat closes     Latex Rash       Social History:     Marital Status: Single   Occupation: Employed  Patient Pre-hospital Living Situation: Lives with family  Patient Pre-hospital Level of Mobility: Ambulatory  Patient Pre-hospital Diet Restrictions: None  Substance Use History:   Social History     Substance and Sexual Activity   Alcohol Use Not Currently     Social History     Tobacco Use   Smoking Status Never Smoker   Smokeless Tobacco Never Used     Social History     Substance and Sexual Activity   Drug Use Not Currently       Family History:    History reviewed  No pertinent family history  Physical Exam:     Vitals:   Blood Pressure: 128/86 (08/18/19 2033)  Pulse: 102 (08/18/19 2033)  Temperature: 98 2 °F (36 8 °C) (08/18/19 1849)  Temp Source: Oral (08/18/19 1849)  Respirations: 18 (08/18/19 2033)  Weight - Scale: 67 1 kg (147 lb 14 9 oz) (08/18/19 1849)  SpO2: 100 % (08/18/19 2033)    Physical Exam   Constitutional: She is oriented to person, place, and time  Vital signs are normal  She appears well-developed  Appears comfortable, no acute distress   HENT:   Head: Normocephalic  Eyes: Pupils are equal, round, and reactive to light  Conjunctivae and EOM are normal  No scleral icterus  Neck: Normal range of motion  Cardiovascular: Normal rate, regular rhythm and normal heart sounds  No murmur heard  Pulmonary/Chest: Effort normal  No respiratory distress  She has wheezes  Diffuse inspiratory and expiratory wheezes bilaterally  Able to speak in full sentences  Able to ambulate to and from bathroom without dyspnea   Abdominal: Soft  Bowel sounds are normal  There is no tenderness  There is no rigidity, no rebound and no guarding  Musculoskeletal: She exhibits no edema, tenderness or deformity  Able to ambulate without difficulty   Neurological: She is alert and oriented to person, place, and time  Skin: Skin is warm and dry  Psychiatric: She has a normal mood and affect  Her speech is normal and behavior is normal    Nursing note and vitals reviewed  Additional Data:     Lab Results: I have personally reviewed pertinent reports  Imaging: I have personally reviewed pertinent reports  No orders to display       EKG, Pathology, and Other Studies Reviewed on Admission:   · Labs and CXR pending    Allscripts / Epic Records Reviewed: Yes     ** Please Note: This note has been constructed using a voice recognition system   **

## 2019-08-19 NOTE — DISCHARGE SUMMARY
Discharge Summary - Medical Linda Prado 47 y o  female MRN: [id number]    [facility] [place] [address] 2 /South 2 M* Encounter: [id number]    BRIEF OVERVIEW    Admitting Provider: Jonathon Pandya DO  Discharge Provider: Courtney Mandel DO  Admission Date: 8/18/2019     Discharge Date: No discharge date for patient encounter  Primary Care Physician at Discharge: Jose R Rios [phone]    Primary Discharge Diagnosis  Acute asthma exacerbation  Acute pulmonary distress    Other Problems Addressed  Patient Active Problem List    Diagnosis Date Noted    Acute asthma exacerbation 08/18/2019    (QFT) QuantiFERON-TB test reaction without active tuberculosis 08/18/2019         Discharge Disposition: home    Allergies  Allergies   Allergen Reactions    Banana      Throat closes     Latex Rash     Diet restrictions:  None  Activity restrictions:  None  Discharge Condition:  Janel Kwong in 1 week  [address] [place] [address]    Presenting Problem/History of Present Illness  Acute asthma exacerbation  Hospital Course  50-year-old female presents with shortness of breath  Acute respiratory distress/asthma exacerbation possible related to dust exposure, she was initiated on IV steroids and was stable on room air without symptoms prior to discharge  Please note chest x-ray revealed no acute cardiopulmonary finding  She is discharged to continue prednisone 20 milligrams for 5 additional days continue use of  her home inhaler    Other medical conditions stable discharge    Please note she will continues with rifampin for possible latent TB    Discharge  Statement   I spent 35 minutes discharging the patient  This time was spent on the day of discharge  I had direct contact with the patient on the day of discharge  Additional documentation is required if more than 30 minutes were spent on discharge     Discussed discharge and follow-up    Discharge instructions/Information to patient and family:   See after visit summary for information provided to patient and family

## 2019-08-19 NOTE — ASSESSMENT & PLAN NOTE
· Presents to the emergency department with worsening shortness of breath x 2 days - acutely worsened today after exposure to dust/pet dander  · Vital signs stable at time of admission  SpO2 98% on RA    Work of breathing improved with neb treatment  · Obtain CXR  · Obtain CBC, CMP, magnesium  · Continue scheduled nebulizer treatments  · Received loading dose solu-medrol - continue 40 mg q 8 hours  · Mucinex BID  · Tessalon perles PRN